# Patient Record
Sex: MALE | Race: WHITE | NOT HISPANIC OR LATINO | Employment: UNEMPLOYED | URBAN - METROPOLITAN AREA
[De-identification: names, ages, dates, MRNs, and addresses within clinical notes are randomized per-mention and may not be internally consistent; named-entity substitution may affect disease eponyms.]

---
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
## 2024-06-25 ENCOUNTER — APPOINTMENT (OUTPATIENT)
Dept: PEDIATRICS | Facility: CLINIC | Age: 19
End: 2024-06-25
Payer: COMMERCIAL

## 2024-06-25 VITALS
WEIGHT: 219.25 LBS | HEIGHT: 71 IN | SYSTOLIC BLOOD PRESSURE: 126 MMHG | HEART RATE: 88 BPM | DIASTOLIC BLOOD PRESSURE: 84 MMHG | BODY MASS INDEX: 30.69 KG/M2

## 2024-06-25 DIAGNOSIS — Z00.00 ENCOUNTER FOR WELL ADULT EXAM WITHOUT ABNORMAL FINDINGS: Primary | ICD-10-CM

## 2024-06-25 DIAGNOSIS — R41.840 ATTENTION DEFICIT: ICD-10-CM

## 2024-06-25 PROCEDURE — 3008F BODY MASS INDEX DOCD: CPT | Performed by: PEDIATRICS

## 2024-06-25 PROCEDURE — 1036F TOBACCO NON-USER: CPT | Performed by: PEDIATRICS

## 2024-06-25 PROCEDURE — 99395 PREV VISIT EST AGE 18-39: CPT | Performed by: PEDIATRICS

## 2024-06-25 NOTE — PROGRESS NOTES
"Subjective   HPI    Domo is a 19 y.o. 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    Concerns today: yes (reported trouble with focusing.  )    General Health: Domo is overall in good health.     Social and Family History: There are no interval changes in child's social and family history. Appropriate parent-teen interactions were observed.     Nutrition: Domo eats a variety of foods including dairy products, fruits, vegetables, meats, and grains/cereals.    Sleep:  Sleep patterns appropriate? yes    Behavior: Behavior is appropriate for age.  Peer relationships are appropriate.     PHQ-9 completed? no, not indicated    Domo is in a stimulating environment and has limited media exposure.    School:   Attended [employer] to play lacrosse.  He is transferring to [employer].  Grades were better first semester than 2nd semester.       Sports:  participates in sports?  yes (lacrosse)   Any history of concussion?: no   Any history of fainting? no   Any history of chest pain with exercise? no   Any first degree relative with heart attack or unexplained death prior to age 50? no    Driving: Domo has a 's license?  yes - 's License  Moving violations? no  Accidents? no  Reviewed car safety such as distracted driving, drinking and driving, speeding, etc.    Dental Care:  regular dental visits? yes  water is fluoridated? yes    Safety topics reviewed:  Domo uses seat belts appropriately.  There are smoke detectors in the home. Carbon monoxide detectors are used in the home.    Domo does own a bicycle helmet and uses it appropriately when riding bikes or scooters.  There is no use of tobacco or other substances.      Review of Systems    Objective     /84   Pulse 88   Ht 1.791 m (5' 10.5\")   Wt 99.5 kg (219 lb 4 oz)   BMI 31.01 kg/m²       Physical Exam  Vitals and nursing note reviewed. Exam conducted with a chaperone present. "   Constitutional:       Appearance: Normal appearance.   HENT:      Head: Normocephalic and atraumatic.      Right Ear: Tympanic membrane, ear canal and external ear normal.      Left Ear: Tympanic membrane, ear canal and external ear normal.      Nose: Nose normal. No congestion or rhinorrhea.      Mouth/Throat:      Mouth: Mucous membranes are moist.   Eyes:      Extraocular Movements: Extraocular movements intact.      Conjunctiva/sclera: Conjunctivae normal.      Pupils: Pupils are equal, round, and reactive to light.   Cardiovascular:      Rate and Rhythm: Normal rate and regular rhythm.      Pulses: Normal pulses.      Heart sounds: No murmur heard.  Pulmonary:      Effort: Pulmonary effort is normal.      Breath sounds: Normal breath sounds.   Abdominal:      General: Abdomen is flat. Bowel sounds are normal.      Palpations: Abdomen is soft.   Genitourinary:     Penis: Normal.       Testes: Normal.   Musculoskeletal:         General: Normal range of motion.      Cervical back: Normal range of motion and neck supple.   Lymphadenopathy:      Cervical: No cervical adenopathy.   Skin:     General: Skin is warm.   Neurological:      General: No focal deficit present.      Mental Status: He is alert.      Cranial Nerves: No cranial nerve deficit.   Psychiatric:         Mood and Affect: Mood normal.         Behavior: Behavior normal.       Assessment/Plan   Problem List Items Addressed This Visit       Encounter for well adult exam without abnormal findings - Primary    Body mass index (BMI) 31.0-31.9, adult    Attention deficit

## 2024-06-25 NOTE — PATIENT INSTRUCTIONS
Omega-3 1500 mg a day or  mg a day. (Not both)    High-protein breakfast    Avoid red dyes such as red dye #40.